# Patient Record
Sex: FEMALE | Race: WHITE | NOT HISPANIC OR LATINO
[De-identification: names, ages, dates, MRNs, and addresses within clinical notes are randomized per-mention and may not be internally consistent; named-entity substitution may affect disease eponyms.]

---

## 2017-08-15 ENCOUNTER — TRANSCRIPTION ENCOUNTER (OUTPATIENT)
Age: 22
End: 2017-08-15

## 2018-12-01 ENCOUNTER — TRANSCRIPTION ENCOUNTER (OUTPATIENT)
Age: 23
End: 2018-12-01

## 2019-02-05 ENCOUNTER — TRANSCRIPTION ENCOUNTER (OUTPATIENT)
Age: 24
End: 2019-02-05

## 2019-03-20 ENCOUNTER — TRANSCRIPTION ENCOUNTER (OUTPATIENT)
Age: 24
End: 2019-03-20

## 2020-03-06 ENCOUNTER — TRANSCRIPTION ENCOUNTER (OUTPATIENT)
Age: 25
End: 2020-03-06

## 2020-10-08 ENCOUNTER — TRANSCRIPTION ENCOUNTER (OUTPATIENT)
Age: 25
End: 2020-10-08

## 2021-08-10 ENCOUNTER — TRANSCRIPTION ENCOUNTER (OUTPATIENT)
Age: 26
End: 2021-08-10

## 2023-05-11 ENCOUNTER — APPOINTMENT (OUTPATIENT)
Dept: DERMATOLOGY | Facility: CLINIC | Age: 28
End: 2023-05-11
Payer: COMMERCIAL

## 2023-05-11 PROCEDURE — 99203 OFFICE O/P NEW LOW 30 MIN: CPT

## 2023-07-07 ENCOUNTER — NON-APPOINTMENT (OUTPATIENT)
Age: 28
End: 2023-07-07

## 2023-08-08 ENCOUNTER — APPOINTMENT (OUTPATIENT)
Dept: OBGYN | Facility: CLINIC | Age: 28
End: 2023-08-08
Payer: COMMERCIAL

## 2023-08-08 ENCOUNTER — NON-APPOINTMENT (OUTPATIENT)
Age: 28
End: 2023-08-08

## 2023-08-08 VITALS
BODY MASS INDEX: 24.16 KG/M2 | DIASTOLIC BLOOD PRESSURE: 70 MMHG | SYSTOLIC BLOOD PRESSURE: 110 MMHG | HEIGHT: 65 IN | WEIGHT: 145 LBS

## 2023-08-08 DIAGNOSIS — N92.6 IRREGULAR MENSTRUATION, UNSPECIFIED: ICD-10-CM

## 2023-08-08 PROCEDURE — 99385 PREV VISIT NEW AGE 18-39: CPT

## 2023-08-08 PROCEDURE — 99212 OFFICE O/P EST SF 10 MIN: CPT | Mod: 25

## 2023-08-08 NOTE — HISTORY OF PRESENT ILLNESS
[FreeTextEntry1] : 29 yo pt p0 here for annual exam  wants amh tested med hx-nc meds-nc, stopped ocps recently periods regular, 5 days, q 4 weeks,, not too heavy or crampy surg- tonsils  fam hx- mother - dcis around 50. all-sulfa as child. denies dc, pain, btb pt a  for [employer], recently ,  a PT w [facility]

## 2023-08-16 LAB
C TRACH RRNA SPEC QL NAA+PROBE: NOT DETECTED
CYTOLOGY CVX/VAG DOC THIN PREP: NORMAL
N GONORRHOEA RRNA SPEC QL NAA+PROBE: NOT DETECTED
SOURCE TP AMPLIFICATION: NORMAL

## 2023-10-02 ENCOUNTER — LABORATORY RESULT (OUTPATIENT)
Age: 28
End: 2023-10-02

## 2023-10-19 ENCOUNTER — TRANSCRIPTION ENCOUNTER (OUTPATIENT)
Age: 28
End: 2023-10-19

## 2023-11-27 ENCOUNTER — NON-APPOINTMENT (OUTPATIENT)
Age: 28
End: 2023-11-27

## 2023-11-30 ENCOUNTER — NON-APPOINTMENT (OUTPATIENT)
Age: 28
End: 2023-11-30

## 2023-12-29 ENCOUNTER — NON-APPOINTMENT (OUTPATIENT)
Age: 28
End: 2023-12-29

## 2024-01-21 ENCOUNTER — NON-APPOINTMENT (OUTPATIENT)
Age: 29
End: 2024-01-21

## 2024-02-20 ENCOUNTER — NON-APPOINTMENT (OUTPATIENT)
Age: 29
End: 2024-02-20

## 2024-03-08 ENCOUNTER — APPOINTMENT (OUTPATIENT)
Dept: INTERNAL MEDICINE | Facility: CLINIC | Age: 29
End: 2024-03-08

## 2024-04-01 ENCOUNTER — APPOINTMENT (OUTPATIENT)
Dept: INTERNAL MEDICINE | Facility: CLINIC | Age: 29
End: 2024-04-01
Payer: COMMERCIAL

## 2024-04-01 ENCOUNTER — NON-APPOINTMENT (OUTPATIENT)
Age: 29
End: 2024-04-01

## 2024-04-01 VITALS
HEIGHT: 65 IN | SYSTOLIC BLOOD PRESSURE: 112 MMHG | OXYGEN SATURATION: 98 % | HEART RATE: 61 BPM | DIASTOLIC BLOOD PRESSURE: 72 MMHG | BODY MASS INDEX: 23.82 KG/M2 | WEIGHT: 143 LBS | RESPIRATION RATE: 12 BRPM

## 2024-04-01 DIAGNOSIS — Z13.6 ENCOUNTER FOR SCREENING FOR CARDIOVASCULAR DISORDERS: ICD-10-CM

## 2024-04-01 DIAGNOSIS — Z80.42 FAMILY HISTORY OF MALIGNANT NEOPLASM OF PROSTATE: ICD-10-CM

## 2024-04-01 DIAGNOSIS — R05.8 OTHER SPECIFIED COUGH: ICD-10-CM

## 2024-04-01 DIAGNOSIS — F32.A ANXIETY DISORDER, UNSPECIFIED: ICD-10-CM

## 2024-04-01 DIAGNOSIS — Z00.00 ENCOUNTER FOR GENERAL ADULT MEDICAL EXAMINATION W/OUT ABNORMAL FINDINGS: ICD-10-CM

## 2024-04-01 DIAGNOSIS — F41.9 ANXIETY DISORDER, UNSPECIFIED: ICD-10-CM

## 2024-04-01 DIAGNOSIS — Z78.9 OTHER SPECIFIED HEALTH STATUS: ICD-10-CM

## 2024-04-01 DIAGNOSIS — Z23 ENCOUNTER FOR IMMUNIZATION: ICD-10-CM

## 2024-04-01 DIAGNOSIS — Z80.3 FAMILY HISTORY OF MALIGNANT NEOPLASM OF BREAST: ICD-10-CM

## 2024-04-01 PROCEDURE — 90715 TDAP VACCINE 7 YRS/> IM: CPT

## 2024-04-01 PROCEDURE — 99385 PREV VISIT NEW AGE 18-39: CPT | Mod: 25

## 2024-04-01 PROCEDURE — 90471 IMMUNIZATION ADMIN: CPT

## 2024-04-01 PROCEDURE — 93000 ELECTROCARDIOGRAM COMPLETE: CPT

## 2024-04-01 PROCEDURE — 36415 COLL VENOUS BLD VENIPUNCTURE: CPT

## 2024-04-01 RX ORDER — PREDNISONE 20 MG/1
20 TABLET ORAL
Qty: 10 | Refills: 0 | Status: ACTIVE | COMMUNITY
Start: 2024-04-01 | End: 1900-01-01

## 2024-04-01 NOTE — REVIEW OF SYSTEMS
[Negative] : Heme/Lymph [Shortness Of Breath] : no shortness of breath [Wheezing] : no wheezing [Cough] : cough [Dyspnea on Exertion] : no dyspnea on exertion

## 2024-04-01 NOTE — ASSESSMENT
[FreeTextEntry1] : 29-year-old female with good general health without any chronic medical issues.  She does have chronic issue with anxiety more than depression and sees a therapist who benefits the patient.  Generally gets along well.  No significant depression.  Patient with continued cough after viral illness about 2 months ago therefore postviral cough with likely tracheitis and treat with prednisone 40 mg daily for 5 days.  Also given referral for chest x-ray.  Continue diet and exercise  GYN evaluation yearly recommended Family history of breast cancer in her mother therefore recommend patient's baseline mammography at 35  Did not receive influenza vaccine this past season Tdap given left deltoid  Venipuncture done today in the office  Follow-up yearly and as needed

## 2024-04-01 NOTE — PHYSICAL EXAM
[No Acute Distress] : no acute distress [Well Nourished] : well nourished [Well Developed] : well developed [Well-Appearing] : well-appearing [Normal Sclera/Conjunctiva] : normal sclera/conjunctiva [PERRL] : pupils equal round and reactive to light [EOMI] : extraocular movements intact [Normal Outer Ear/Nose] : the outer ears and nose were normal in appearance [No JVD] : no jugular venous distention [Normal Oropharynx] : the oropharynx was normal [No Lymphadenopathy] : no lymphadenopathy [Supple] : supple [No Respiratory Distress] : no respiratory distress  [Thyroid Normal, No Nodules] : the thyroid was normal and there were no nodules present [Clear to Auscultation] : lungs were clear to auscultation bilaterally [No Accessory Muscle Use] : no accessory muscle use [Normal S1, S2] : normal S1 and S2 [Normal Rate] : normal rate  [Regular Rhythm] : with a regular rhythm [No Carotid Bruits] : no carotid bruits [No Murmur] : no murmur heard [No Abdominal Bruit] : a ~M bruit was not heard ~T in the abdomen [No Varicosities] : no varicosities [No Palpable Aorta] : no palpable aorta [No Edema] : there was no peripheral edema [Pedal Pulses Present] : the pedal pulses are present [Soft] : abdomen soft [No Extremity Clubbing/Cyanosis] : no extremity clubbing/cyanosis [Non Tender] : non-tender [Non-distended] : non-distended [No Masses] : no abdominal mass palpated [No HSM] : no HSM [Normal Bowel Sounds] : normal bowel sounds [Normal Anterior Cervical Nodes] : no anterior cervical lymphadenopathy [Normal Posterior Cervical Nodes] : no posterior cervical lymphadenopathy [No CVA Tenderness] : no CVA  tenderness [No Spinal Tenderness] : no spinal tenderness [Grossly Normal Strength/Tone] : grossly normal strength/tone [No Joint Swelling] : no joint swelling [No Focal Deficits] : no focal deficits [No Rash] : no rash [Coordination Grossly Intact] : coordination grossly intact [Normal Gait] : normal gait [Deep Tendon Reflexes (DTR)] : deep tendon reflexes were 2+ and symmetric [Normal Affect] : the affect was normal [Normal Insight/Judgement] : insight and judgment were intact [Normal TMs] : both tympanic membranes were normal [Speech Grossly Normal] : speech grossly normal [Normal Mood] : the mood was normal

## 2024-04-01 NOTE — HISTORY OF PRESENT ILLNESS
[de-identified] : 29-year-old female presents to this office for the first time to establish care and for her annual wellness visit.  General health is good without any significant chronic medical issues including cardiopulmonary, hepatorenal, hematological or diabetes. She does states she has long history of anxiety with mild depression which she still suffers from and sees a therapist who helps her.  No history of being treated with medication.  Doing okay with this.  Generally feeling well without complaints of chest pain, palpitations, shortness of breath or edema.  No GI symptoms.  She does state about 2 months ago she had a viral illness which resolved but has been left with this dry hacking cough which is spurred on by laughing or taking a deep breath.  No shortness of breath, fever, chills or rash.  Otherwise feels well.  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 [FreeTextEntry1] : Annual wellness visit

## 2024-04-01 NOTE — HEALTH RISK ASSESSMENT
[Very Good] : ~his/her~ current health as very good [Good] : ~his/her~  mood as  good [2 - 3 times a week (3 pts)] : 2 - 3  times a week (3 points) [1 or 2 (0 pts)] : 1 or 2 (0 points) [Never (0 pts)] : Never (0 points) [No falls in past year] : Patient reported no falls in the past year [No] : In the past 12 months have you used drugs other than those required for medical reasons? No [Patient reported PAP Smear was normal] : Patient reported PAP Smear was normal [With Significant Other] : lives with significant other [None] : None [College] : College [Employed] : employed [] :  [# Of Children ___] : has [unfilled] children [Feels Safe at Home] : Feels safe at home [Fully functional (bathing, dressing, toileting, transferring, walking, feeding)] : Fully functional (bathing, dressing, toileting, transferring, walking, feeding) [Fully functional (using the telephone, shopping, preparing meals, housekeeping, doing laundry, using] : Fully functional and needs no help or supervision to perform IADLs (using the telephone, shopping, preparing meals, housekeeping, doing laundry, using transportation, managing medications and managing finances) [Smoke Detector] : smoke detector [Carbon Monoxide Detector] : carbon monoxide detector [Sunscreen] : uses sunscreen [Seat Belt] :  uses seat belt [Designated Healthcare Proxy] : Designated healthcare proxy [Name: ___] : Health Care Proxy's Name: [unfilled]  [Relationship: ___] : Relationship: [unfilled] [Audit-CScore] : 3 [de-identified] : exercises [Change in mental status noted] : No change in mental status noted [de-identified] : good [Reports changes in vision] : Reports no changes in vision [Reports changes in hearing] : Reports no changes in hearing [PapSmearDate] : 08/23 [Reports changes in dental health] : Reports no changes in dental health [AdvancecareDate] : 04/24 [Never] : Never

## 2024-04-02 ENCOUNTER — TRANSCRIPTION ENCOUNTER (OUTPATIENT)
Age: 29
End: 2024-04-02

## 2024-04-02 LAB
ALBUMIN SERPL ELPH-MCNC: 4.8 G/DL
ALP BLD-CCNC: 82 U/L
ALT SERPL-CCNC: 22 U/L
ANION GAP SERPL CALC-SCNC: 12 MMOL/L
APPEARANCE: CLEAR
AST SERPL-CCNC: 29 U/L
BACTERIA: ABNORMAL /HPF
BASOPHILS # BLD AUTO: 0.05 K/UL
BASOPHILS NFR BLD AUTO: 0.9 %
BILIRUB SERPL-MCNC: 0.4 MG/DL
BILIRUBIN URINE: NEGATIVE
BLOOD URINE: NEGATIVE
BUN SERPL-MCNC: 10 MG/DL
CALCIUM SERPL-MCNC: 9.8 MG/DL
CAST: 0 /LPF
CHLORIDE SERPL-SCNC: 101 MMOL/L
CHOLEST SERPL-MCNC: 177 MG/DL
CO2 SERPL-SCNC: 25 MMOL/L
COLOR: YELLOW
CREAT SERPL-MCNC: 0.77 MG/DL
EGFR: 107 ML/MIN/1.73M2
EOSINOPHIL # BLD AUTO: 0.11 K/UL
EOSINOPHIL NFR BLD AUTO: 2 %
EPITHELIAL CELLS: 3 /HPF
GLUCOSE QUALITATIVE U: NEGATIVE MG/DL
GLUCOSE SERPL-MCNC: 90 MG/DL
HCT VFR BLD CALC: 41.8 %
HDLC SERPL-MCNC: 88 MG/DL
HGB BLD-MCNC: 13.7 G/DL
IMM GRANULOCYTES NFR BLD AUTO: 0.2 %
KETONES URINE: NEGATIVE MG/DL
LDLC SERPL CALC-MCNC: 80 MG/DL
LEUKOCYTE ESTERASE URINE: ABNORMAL
LYMPHOCYTES # BLD AUTO: 2.26 K/UL
LYMPHOCYTES NFR BLD AUTO: 41.1 %
MAN DIFF?: NORMAL
MCHC RBC-ENTMCNC: 31.4 PG
MCHC RBC-ENTMCNC: 32.8 GM/DL
MCV RBC AUTO: 95.9 FL
MICROSCOPIC-UA: NORMAL
MONOCYTES # BLD AUTO: 0.43 K/UL
MONOCYTES NFR BLD AUTO: 7.8 %
NEUTROPHILS # BLD AUTO: 2.64 K/UL
NEUTROPHILS NFR BLD AUTO: 48 %
NITRITE URINE: NEGATIVE
NONHDLC SERPL-MCNC: 89 MG/DL
PH URINE: 6.5
PLATELET # BLD AUTO: 252 K/UL
POTASSIUM SERPL-SCNC: 4.7 MMOL/L
PROT SERPL-MCNC: 7.5 G/DL
PROTEIN URINE: NEGATIVE MG/DL
RBC # BLD: 4.36 M/UL
RBC # FLD: 13.2 %
RED BLOOD CELLS URINE: 0 /HPF
SODIUM SERPL-SCNC: 138 MMOL/L
SPECIFIC GRAVITY URINE: 1.01
TRIGL SERPL-MCNC: 46 MG/DL
UROBILINOGEN URINE: 0.2 MG/DL
WBC # FLD AUTO: 5.5 K/UL
WHITE BLOOD CELLS URINE: 3 /HPF

## 2024-05-13 ENCOUNTER — APPOINTMENT (OUTPATIENT)
Dept: DERMATOLOGY | Facility: CLINIC | Age: 29
End: 2024-05-13
Payer: COMMERCIAL

## 2024-05-13 PROCEDURE — 99213 OFFICE O/P EST LOW 20 MIN: CPT

## 2024-07-01 ENCOUNTER — NON-APPOINTMENT (OUTPATIENT)
Age: 29
End: 2024-07-01

## 2024-07-04 ENCOUNTER — NON-APPOINTMENT (OUTPATIENT)
Age: 29
End: 2024-07-04

## 2024-07-06 ENCOUNTER — NON-APPOINTMENT (OUTPATIENT)
Age: 29
End: 2024-07-06

## 2024-09-04 ENCOUNTER — APPOINTMENT (OUTPATIENT)
Dept: OBGYN | Facility: CLINIC | Age: 29
End: 2024-09-04
Payer: COMMERCIAL

## 2024-09-04 VITALS
SYSTOLIC BLOOD PRESSURE: 114 MMHG | BODY MASS INDEX: 24.16 KG/M2 | DIASTOLIC BLOOD PRESSURE: 76 MMHG | HEART RATE: 67 BPM | HEIGHT: 65 IN | WEIGHT: 145 LBS

## 2024-09-04 DIAGNOSIS — Z00.00 ENCOUNTER FOR GENERAL ADULT MEDICAL EXAMINATION W/OUT ABNORMAL FINDINGS: ICD-10-CM

## 2024-09-04 DIAGNOSIS — Z01.419 ENCOUNTER FOR GYNECOLOGICAL EXAMINATION (GENERAL) (ROUTINE) W/OUT ABNORMAL FINDINGS: ICD-10-CM

## 2024-09-04 DIAGNOSIS — N39.0 URINARY TRACT INFECTION, SITE NOT SPECIFIED: ICD-10-CM

## 2024-09-04 PROCEDURE — 99395 PREV VISIT EST AGE 18-39: CPT

## 2024-09-04 PROCEDURE — 99213 OFFICE O/P EST LOW 20 MIN: CPT | Mod: 25

## 2024-09-04 RX ORDER — NITROFURANTOIN MACROCRYSTALS 100 MG/1
100 CAPSULE ORAL
Qty: 28 | Refills: 1 | Status: ACTIVE | COMMUNITY
Start: 2024-09-04 | End: 1900-01-01

## 2024-09-04 NOTE — HISTORY OF PRESENT ILLNESS
[FreeTextEntry1] : 28 yo p0 here for annual exam. co severe midcycle cramping past year since stopping ocps. alternates sides, helped by heat , motrin.  periods crampy qo mo considering conception in a year.  denies btb, dc, pel pain, bladder issues gets utis 2-3 x /year.  not necessarily assos alanna fernandez.  m- br ca, hx hyst.

## 2024-09-04 NOTE — PLAN
[FreeTextEntry1] : recurrent uti, prophylactic macrobid prescribed. uti hygiene dw pt. mercury avoidance dw pt.  fu 1 yr or prn.

## 2024-09-09 LAB — CYTOLOGY CVX/VAG DOC THIN PREP: NORMAL

## 2024-12-16 ENCOUNTER — NON-APPOINTMENT (OUTPATIENT)
Age: 29
End: 2024-12-16

## 2025-04-11 ENCOUNTER — APPOINTMENT (OUTPATIENT)
Dept: INTERNAL MEDICINE | Facility: CLINIC | Age: 30
End: 2025-04-11

## 2025-04-11 DIAGNOSIS — Z13.6 ENCOUNTER FOR SCREENING FOR CARDIOVASCULAR DISORDERS: ICD-10-CM

## 2025-04-11 DIAGNOSIS — Z00.00 ENCOUNTER FOR GENERAL ADULT MEDICAL EXAMINATION W/OUT ABNORMAL FINDINGS: ICD-10-CM

## 2025-05-06 ENCOUNTER — NON-APPOINTMENT (OUTPATIENT)
Age: 30
End: 2025-05-06

## 2025-05-08 ENCOUNTER — APPOINTMENT (OUTPATIENT)
Dept: OBGYN | Facility: CLINIC | Age: 30
End: 2025-05-08
Payer: COMMERCIAL

## 2025-05-08 VITALS
SYSTOLIC BLOOD PRESSURE: 100 MMHG | HEIGHT: 65 IN | BODY MASS INDEX: 24.16 KG/M2 | WEIGHT: 145 LBS | DIASTOLIC BLOOD PRESSURE: 70 MMHG

## 2025-05-08 DIAGNOSIS — Z32.01 ENCOUNTER FOR PREGNANCY TEST, RESULT POSITIVE: ICD-10-CM

## 2025-05-08 PROCEDURE — 81025 URINE PREGNANCY TEST: CPT

## 2025-05-08 PROCEDURE — 36415 COLL VENOUS BLD VENIPUNCTURE: CPT

## 2025-05-08 PROCEDURE — 99214 OFFICE O/P EST MOD 30 MIN: CPT

## 2025-05-08 PROCEDURE — 99459 PELVIC EXAMINATION: CPT

## 2025-05-09 LAB
ABORH: NORMAL
HCG SERPL-MCNC: ABNORMAL MIU/ML
HCG UR QL: POSITIVE
QUALITY CONTROL: YES

## 2025-05-13 PROBLEM — Z32.01 POSITIVE URINE PREGNANCY TEST: Status: ACTIVE | Noted: 2025-05-13

## 2025-05-14 ENCOUNTER — APPOINTMENT (OUTPATIENT)
Dept: DERMATOLOGY | Facility: CLINIC | Age: 30
End: 2025-05-14

## 2025-05-23 ENCOUNTER — APPOINTMENT (OUTPATIENT)
Dept: OBGYN | Facility: CLINIC | Age: 30
End: 2025-05-23
Payer: COMMERCIAL

## 2025-05-23 VITALS
HEIGHT: 65 IN | BODY MASS INDEX: 24.16 KG/M2 | WEIGHT: 145 LBS | DIASTOLIC BLOOD PRESSURE: 60 MMHG | SYSTOLIC BLOOD PRESSURE: 110 MMHG

## 2025-05-23 DIAGNOSIS — Z32.01 ENCOUNTER FOR PREGNANCY TEST, RESULT POSITIVE: ICD-10-CM

## 2025-05-23 PROCEDURE — 99214 OFFICE O/P EST MOD 30 MIN: CPT

## 2025-06-03 DIAGNOSIS — Z13.79 ENCOUNTER FOR OTHER SCREENING FOR GENETIC AND CHROMOSOMAL ANOMALIES: ICD-10-CM

## 2025-06-03 DIAGNOSIS — Z34.91 ENCOUNTER FOR SUPERVISION OF NORMAL PREGNANCY, UNSPECIFIED, FIRST TRIMESTER: ICD-10-CM

## 2025-06-11 ENCOUNTER — APPOINTMENT (OUTPATIENT)
Dept: ANTEPARTUM | Facility: CLINIC | Age: 30
End: 2025-06-11
Payer: COMMERCIAL

## 2025-06-11 ENCOUNTER — ASOB RESULT (OUTPATIENT)
Age: 30
End: 2025-06-11

## 2025-06-11 PROCEDURE — 76801 OB US < 14 WKS SINGLE FETUS: CPT

## 2025-06-12 ENCOUNTER — LABORATORY RESULT (OUTPATIENT)
Age: 30
End: 2025-06-12

## 2025-06-13 ENCOUNTER — NON-APPOINTMENT (OUTPATIENT)
Age: 30
End: 2025-06-13

## 2025-06-13 ENCOUNTER — APPOINTMENT (OUTPATIENT)
Dept: OBGYN | Facility: CLINIC | Age: 30
End: 2025-06-13
Payer: COMMERCIAL

## 2025-06-13 VITALS
SYSTOLIC BLOOD PRESSURE: 118 MMHG | DIASTOLIC BLOOD PRESSURE: 70 MMHG | BODY MASS INDEX: 26.22 KG/M2 | WEIGHT: 157.38 LBS | HEIGHT: 65 IN

## 2025-06-13 LAB
APPEARANCE: CLEAR
BILIRUBIN URINE: NEGATIVE
BLOOD URINE: NEGATIVE
COLOR: YELLOW
GLUCOSE QUALITATIVE U: NEGATIVE
KETONES URINE: NEGATIVE
LEUKOCYTE ESTERASE URINE: ABNORMAL
NITRITE URINE: NEGATIVE
PH URINE: 6.5
PROTEIN URINE: NEGATIVE
SPECIFIC GRAVITY URINE: <=1.005
UROBILINOGEN URINE: 0.2 (ref 0.2–?)

## 2025-06-13 PROCEDURE — 36415 COLL VENOUS BLD VENIPUNCTURE: CPT

## 2025-06-13 PROCEDURE — 0502F SUBSEQUENT PRENATAL CARE: CPT

## 2025-06-14 LAB
ALBUMIN SERPL ELPH-MCNC: 3.8 G/DL
ALP BLD-CCNC: 65 U/L
ALT SERPL-CCNC: 18 U/L
ANION GAP SERPL CALC-SCNC: 20 MMOL/L
APPEARANCE: CLEAR
AST SERPL-CCNC: 20 U/L
BILIRUB SERPL-MCNC: <0.2 MG/DL
BILIRUBIN URINE: NEGATIVE
BLOOD URINE: NEGATIVE
BUN SERPL-MCNC: 8 MG/DL
CALCIUM SERPL-MCNC: 9.2 MG/DL
CHLORIDE SERPL-SCNC: 99 MMOL/L
CO2 SERPL-SCNC: 20 MMOL/L
COLOR: YELLOW
CREAT SERPL-MCNC: 0.6 MG/DL
EGFRCR SERPLBLD CKD-EPI 2021: 124 ML/MIN/1.73M2
ESTIMATED AVERAGE GLUCOSE: 97 MG/DL
GLUCOSE QUALITATIVE U: NEGATIVE MG/DL
GLUCOSE SERPL-MCNC: NORMAL MG/DL
HBA1C MFR BLD HPLC: 5 %
HCT VFR BLD CALC: 38.2 %
HGB BLD-MCNC: 11.7 G/DL
HIV1+2 AB SPEC QL IA.RAPID: NONREACTIVE
KETONES URINE: NEGATIVE MG/DL
LEUKOCYTE ESTERASE URINE: ABNORMAL
MCHC RBC-ENTMCNC: 30.6 G/DL
MCHC RBC-ENTMCNC: 31.1 PG
MCV RBC AUTO: 101.6 FL
NITRITE URINE: NEGATIVE
PH URINE: 7
PLATELET # BLD AUTO: 252 K/UL
POTASSIUM SERPL-SCNC: 3.9 MMOL/L
PROT SERPL-MCNC: 6.1 G/DL
PROTEIN URINE: NEGATIVE MG/DL
RBC # BLD: 3.76 M/UL
RBC # FLD: 13 %
SODIUM SERPL-SCNC: 139 MMOL/L
SPECIFIC GRAVITY URINE: 1
TSH SERPL-ACNC: 0.24 UIU/ML
UROBILINOGEN URINE: 0.2 MG/DL
WBC # FLD AUTO: 8.26 K/UL

## 2025-06-15 LAB
HBV SURFACE AG SER QL: NONREACTIVE
HCV AB SER QL: NONREACTIVE
HCV S/CO RATIO: 0.09 S/CO
MEV IGG FLD QL IA: 116 AU/ML
MEV IGG+IGM SER-IMP: POSITIVE
MUV AB SER-ACNC: POSITIVE
MUV IGG SER QL IA: 254 AU/ML
RUBV IGG FLD-ACNC: 4.08 INDEX
RUBV IGG SER-IMP: POSITIVE
T PALLIDUM AB SER QL IA: NEGATIVE
VZV AB TITR SER: POSITIVE
VZV IGG SER IF-ACNC: 12.1 S/CO

## 2025-06-16 ENCOUNTER — APPOINTMENT (OUTPATIENT)
Dept: OBGYN | Facility: CLINIC | Age: 30
End: 2025-06-16

## 2025-06-16 PROCEDURE — 36415 COLL VENOUS BLD VENIPUNCTURE: CPT

## 2025-06-20 ENCOUNTER — APPOINTMENT (OUTPATIENT)
Dept: INTERNAL MEDICINE | Facility: CLINIC | Age: 30
End: 2025-06-20

## 2025-06-25 ENCOUNTER — ASOB RESULT (OUTPATIENT)
Age: 30
End: 2025-06-25

## 2025-06-25 ENCOUNTER — APPOINTMENT (OUTPATIENT)
Dept: ANTEPARTUM | Facility: CLINIC | Age: 30
End: 2025-06-25
Payer: COMMERCIAL

## 2025-06-25 PROCEDURE — 76813 OB US NUCHAL MEAS 1 GEST: CPT

## 2025-07-02 ENCOUNTER — APPOINTMENT (OUTPATIENT)
Dept: MATERNAL FETAL MEDICINE | Facility: CLINIC | Age: 30
End: 2025-07-02
Payer: COMMERCIAL

## 2025-07-02 ENCOUNTER — ASOB RESULT (OUTPATIENT)
Age: 30
End: 2025-07-02

## 2025-07-02 PROCEDURE — 99202 OFFICE O/P NEW SF 15 MIN: CPT | Mod: 95

## 2025-07-10 ENCOUNTER — APPOINTMENT (OUTPATIENT)
Dept: OBGYN | Facility: CLINIC | Age: 30
End: 2025-07-10
Payer: COMMERCIAL

## 2025-07-10 VITALS
WEIGHT: 157.44 LBS | DIASTOLIC BLOOD PRESSURE: 60 MMHG | BODY MASS INDEX: 26.23 KG/M2 | SYSTOLIC BLOOD PRESSURE: 102 MMHG | HEIGHT: 65 IN

## 2025-07-10 PROCEDURE — 0502F SUBSEQUENT PRENATAL CARE: CPT

## 2025-07-11 LAB
APPEARANCE: CLEAR
BILIRUBIN URINE: NEGATIVE
BLOOD URINE: NEGATIVE
COLOR: YELLOW
GLUCOSE QUALITATIVE U: NEGATIVE
KETONES URINE: NEGATIVE
LEUKOCYTE ESTERASE URINE: NEGATIVE
NITRITE URINE: NEGATIVE
PH URINE: 6.5
PROTEIN URINE: NEGATIVE
SPECIFIC GRAVITY URINE: <=1.005
UROBILINOGEN URINE: 0.2 (ref 0.2–?)

## 2025-07-16 ENCOUNTER — APPOINTMENT (OUTPATIENT)
Dept: OBGYN | Facility: CLINIC | Age: 30
End: 2025-07-16
Payer: COMMERCIAL

## 2025-07-16 PROBLEM — R79.89 LOW TSH LEVEL: Status: ACTIVE | Noted: 2025-07-16

## 2025-07-16 PROBLEM — Z34.91 FIRST TRIMESTER PREGNANCY: Status: RESOLVED | Noted: 2025-06-03 | Resolved: 2025-07-16

## 2025-07-16 PROBLEM — Z34.92 SECOND TRIMESTER PREGNANCY: Status: ACTIVE | Noted: 2025-07-16

## 2025-07-16 LAB
T3FREE SERPL-MCNC: 2.71 PG/ML
T4 FREE SERPL-MCNC: 0.9 NG/DL
T4 SERPL-MCNC: 9.7 UG/DL
TSH SERPL-ACNC: 1.2 UIU/ML

## 2025-07-16 PROCEDURE — 36415 COLL VENOUS BLD VENIPUNCTURE: CPT

## 2025-07-17 LAB
2ND TRIMESTER 4 SCREEN SERPL-IMP: NO
AFP ADJ MOM SERPL: 0.95
AFP INTERP SERPL-IMP: NORMAL
AFP INTERP SERPL-IMP: NORMAL
AFP MOM CUT-OFF: 2.5
AFP PERCENTILE: 43.4
AFP SERPL-ACNC: 29.66 NG/ML
CARBAMAZEPINE?: NO
CURRENT SMOKER: NO
DIABETES STATUS PATIENT: NO
GA: NORMAL
GESTATIONAL AGE METHOD: NORMAL
HX OF NTD NARR: NO
MULTIPLE PREGNANCY: NORMAL
NEURAL TUBE DEFECT RISK FETUS: NORMAL
NEURAL TUBE DEFECT RISK POP: NORMAL
TEST PERFORMANCE INFO SPEC: NORMAL
VALPROIC ACID?: NO

## 2025-08-13 ENCOUNTER — NON-APPOINTMENT (OUTPATIENT)
Age: 30
End: 2025-08-13

## 2025-08-20 ENCOUNTER — ASOB RESULT (OUTPATIENT)
Age: 30
End: 2025-08-20

## 2025-08-20 ENCOUNTER — APPOINTMENT (OUTPATIENT)
Dept: ANTEPARTUM | Facility: CLINIC | Age: 30
End: 2025-08-20
Payer: COMMERCIAL

## 2025-08-20 PROCEDURE — 76817 TRANSVAGINAL US OBSTETRIC: CPT

## 2025-08-20 PROCEDURE — 76811 OB US DETAILED SNGL FETUS: CPT

## 2025-08-21 ENCOUNTER — APPOINTMENT (OUTPATIENT)
Dept: OBGYN | Facility: CLINIC | Age: 30
End: 2025-08-21
Payer: COMMERCIAL

## 2025-08-21 VITALS
HEIGHT: 65 IN | SYSTOLIC BLOOD PRESSURE: 116 MMHG | BODY MASS INDEX: 27.32 KG/M2 | WEIGHT: 164 LBS | DIASTOLIC BLOOD PRESSURE: 70 MMHG

## 2025-08-21 LAB
APPEARANCE: CLEAR
BILIRUBIN URINE: NEGATIVE
BLOOD URINE: NEGATIVE
COLOR: YELLOW
GLUCOSE QUALITATIVE U: NEGATIVE
KETONES URINE: NEGATIVE
LEUKOCYTE ESTERASE URINE: ABNORMAL
NITRITE URINE: NEGATIVE
PH URINE: 7.5
PROTEIN URINE: NEGATIVE
SPECIFIC GRAVITY URINE: 1.01
UROBILINOGEN URINE: 0.2 (ref 0.2–?)

## 2025-08-21 PROCEDURE — 0502F SUBSEQUENT PRENATAL CARE: CPT
